# Patient Record
Sex: FEMALE | URBAN - METROPOLITAN AREA
[De-identification: names, ages, dates, MRNs, and addresses within clinical notes are randomized per-mention and may not be internally consistent; named-entity substitution may affect disease eponyms.]

---

## 2021-01-28 ENCOUNTER — PATIENT OUTREACH (OUTPATIENT)
Dept: OTHER | Age: 56
End: 2021-01-28

## 2021-01-28 NOTE — PROGRESS NOTES
Patient on report as eligible for Case Management. Left discreet message on voicemail with this CM contact information. Will attempt to contact again to offer [phone] Management services. Chart review received from Kelin Alexandra with [address] regarding High Cost ENSEMBLE:    Patrice Pool, [birth year]: [phone]    [facility] 9/28/20-10/6/20     62 yo female admitted 9/28 to ED w/ abdominal pain.   T 103 /114  CT A/P- diverticulitis of sigmoid colon, developing colo-vesicular fistula  -  Treatments:  Rocephin  Flagyl  -  PMH- L Kidney transplant, Lupus, Courtney, L femur ORIF

## 2021-01-29 ENCOUNTER — PATIENT OUTREACH (OUTPATIENT)
Dept: OTHER | Age: 56
End: 2021-01-29

## 2021-01-29 NOTE — PROGRESS NOTES
Patient identified as eligible for [address] [place] [address] services. Second telephone outreach attempted. Patient requested to call ACM back next week. CM confidential contact information.

## 2021-02-15 ENCOUNTER — PATIENT OUTREACH (OUTPATIENT)
Dept: OTHER | Age: 56
End: 2021-02-15

## 2021-02-15 NOTE — PROGRESS NOTES
Resolving current episode for case management due to patient unable to reach. Patient has not been reached after repeated calls. This writer's contact information provided on VM.

## 2021-06-02 ENCOUNTER — PATIENT OUTREACH (OUTPATIENT)
Dept: OTHER | Age: 56
End: 2021-06-02

## 2021-06-02 NOTE — PROGRESS NOTES
Referral received for the following [employer] employee.     [place] [facility] [place] DX: Dyspnea, anemia H/O kidney transplant, colostomy, RA/SLE, HTN

## 2021-06-02 NOTE — PROGRESS NOTES
Patient on report as eligible for Case Management. Left discreet message on voicemail with this CM contact information. Will attempt to contact again to offer [phone] Management services.

## 2021-06-03 ENCOUNTER — PATIENT OUTREACH (OUTPATIENT)
Dept: OTHER | Age: 56
End: 2021-06-03

## 2021-06-03 NOTE — PROGRESS NOTES
Patient identified as eligible for [address] [place] [address] services. Second telephone outreach attempted. Unable to leave a discreet voicemail with this CM confidential contact information, mailbox not set up.

## 2021-06-07 ENCOUNTER — PATIENT OUTREACH (OUTPATIENT)
Dept: OTHER | Age: 56
End: 2021-06-07

## 2021-06-07 NOTE — PROGRESS NOTES
Patient identified as eligible for [address] [place] [address] services. Third telephone outreach attempted. Left discreet voicemail with this CM confidential contact information.

## 2021-06-30 ENCOUNTER — PATIENT OUTREACH (OUTPATIENT)
Dept: OTHER | Age: 56
End: 2021-06-30